# Patient Record
Sex: FEMALE | URBAN - METROPOLITAN AREA
[De-identification: names, ages, dates, MRNs, and addresses within clinical notes are randomized per-mention and may not be internally consistent; named-entity substitution may affect disease eponyms.]

---

## 2021-01-04 ENCOUNTER — OFFICE VISIT (OUTPATIENT)
Dept: URBAN - METROPOLITAN AREA CLINIC 48 | Facility: CLINIC | Age: 36
End: 2021-01-04

## 2021-01-04 DIAGNOSIS — L03.213 PRESEPTAL CELLULITIS: Primary | ICD-10-CM

## 2021-01-04 PROCEDURE — 92004 COMPRE OPH EXAM NEW PT 1/>: CPT | Performed by: STUDENT IN AN ORGANIZED HEALTH CARE EDUCATION/TRAINING PROGRAM

## 2021-01-04 RX ORDER — AMOXICILLIN AND CLAVULANATE POTASSIUM 875; 125 MG/1; 1/1
TABLET, FILM COATED ORAL
Qty: 14 | Refills: 0 | Status: INACTIVE
Start: 2021-01-04 | End: 2021-01-10

## 2021-01-04 ASSESSMENT — INTRAOCULAR PRESSURE
OS: 20
OD: 19

## 2021-01-04 NOTE — IMPRESSION/PLAN
Impression: Preseptal cellulitis: C55.583. Plan: Preseptal erythema, swelling, and warm to the touch No signs of orbital involvement Early chalazion formation Start Augmenting BID x7 days Reassess on Wednesday

## 2021-01-06 ENCOUNTER — OFFICE VISIT (OUTPATIENT)
Dept: URBAN - METROPOLITAN AREA CLINIC 48 | Facility: CLINIC | Age: 36
End: 2021-01-06

## 2021-01-06 PROCEDURE — 92012 INTRM OPH EXAM EST PATIENT: CPT | Performed by: STUDENT IN AN ORGANIZED HEALTH CARE EDUCATION/TRAINING PROGRAM

## 2021-01-06 ASSESSMENT — INTRAOCULAR PRESSURE
OD: 18
OS: 19

## 2021-01-06 NOTE — IMPRESSION/PLAN
Impression: Preseptal cellulitis: U53.178. Plan: Edema, erythema, and warmth resolved Persistent chalazion RUL Start warm compresses QID and lid scrubs daily Continue full course of ABx
reassess in 1-2 weeks